# Patient Record
(demographics unavailable — no encounter records)

---

## 2025-03-18 NOTE — PHYSICAL EXAM
[No Acute Distress] : no acute distress [Well Nourished] : well nourished [Well Developed] : well developed [No Respiratory Distress] : no respiratory distress  [No Accessory Muscle Use] : no accessory muscle use [Clear to Auscultation] : lungs were clear to auscultation bilaterally [Regular Rhythm] : with a regular rhythm [Normal S1, S2] : normal S1 and S2 [Coordination Grossly Intact] : coordination grossly intact [No Focal Deficits] : no focal deficits [Normal Gait] : normal gait [Normal Affect] : the affect was normal [Alert and Oriented x3] : oriented to person, place, and time [Normal Insight/Judgement] : insight and judgment were intact

## 2025-03-18 NOTE — HISTORY OF PRESENT ILLNESS
[FreeTextEntry1] : f/up  [de-identified] : 46 yr old female h/o PMHx HTN, DM2 (diet controlled), fibroids, ISABEL, depression, obesity. Pt former  Dr. Zhao pt, she saw Dr. Mir for CPE in May 2024 as she was living in [place]. She has since moved to [place] and wants to reestablish with this office  She ran out of her Amlodipine  , hasn't taken it in 1 week. BP today- 136/94. She states was given Norvasc at last refill , felt less side effects on it . requesting brand name   Her last HBG a1c- 6.6 % on 5/30/24, was prescribed Metformin Er 500 mg po qd but never took the med. Will recheck Hbg A1c today .  She also has a h/o fibroids, has IUS Mirena in,  states  has vaginal spotting. Taking iron tabs , wants new GYn MD.      Denies chest pain, palpitations, SOB, fever, chills, abdominal pain, nausea, vomiting, diarrhea

## 2025-03-18 NOTE — ASSESSMENT
[FreeTextEntry1] : 1, HTN  restart Norvasc 5 m gpo qd  ( pt counseled on stopping medications )  Limit sodium 2 gr daily  check BP at home, call if > 150/90  try to get 150 minuets of  exercise weekly  appropriate BW ordered today   2. DM  chekc HBG a1c   Limit carbohydrate intake by minimizing white bread / rice / pasta/ soda , potato, no added sugar.  3. abnormal Vaginal spotting  pt referred to Gyn  MD, Dr. Marquez   OV 3  months

## 2025-03-18 NOTE — REVIEW OF SYSTEMS
[Earache] : no earache [Chest Pain] : no chest pain [Shortness Of Breath] : no shortness of breath [Abdominal Pain] : no abdominal pain [Dysuria] : no dysuria [Joint Pain] : no joint pain [Itching] : no itching [Headache] : no headache [Negative] : Constitutional

## 2025-05-12 NOTE — PLAN
[FreeTextEntry1] : Plans LESS LSH for menorrhagia. Plan pre op sonogram to evaluate fibroid uterus. Patient to follow up in 1 year for annual GYN exam  Mammogram and bilateral breast US due: now, overdue Colonoscopy due: now Bone density due: pm Pap ordered  Hemoccult ordered   All questions answered, patient agreeable with plan.  I Kerrie HERNANDEZ am scribing for the presence of Dr. Scar Marquez the following sections HISTORY OF PRESENT ILLNESS, PAST MEDICAL/FAMILY/SOCIAL HISTORY; REVIEW OF SYSTEMS; VITAL SIGNS; PHYSICAL EXAM; DISPOSITION. I personally performed the services described in the documentation, reviewed the documentation recorded by the scribe in my presence and it accurately and completely records my words and actions

## 2025-05-12 NOTE — HISTORY OF PRESENT ILLNESS
[FreeTextEntry1] : 46 year old female  PMHx HTN, DM2 (diet controlled), fibroids, ISABEL, depression, obesity Mirena IUD in place 10/2023 presents as a new patient here to establish care. referred by Mirian Recinos NP.  Patient is reports since IUD has been placed she's experiencing continuous vaginal bleeding. She reports fatigue.  Hemocue Today in office 9.8.  She had a GI consult but they deferred exam and advised GYN consult. Never had a Mammo.

## 2025-05-12 NOTE — HISTORY OF PRESENT ILLNESS
[FreeTextEntry1] : 46 year old female  PMHx HTN, DM2 (diet controlled), fibroids, ISABEL, depression, obesity Mirena IUD in place 10/2023 presents as a new patient here to establish care. referred by Mirain Recinos NP.  Patient is reports since IUD has been placed she's experiencing continuous vaginal bleeding. She reports fatigue.  Hemocue Today in office 9.8.  She had a GI consult but they deferred exam and advised GYN consult. Never had a Mammo.

## 2025-05-12 NOTE — PHYSICAL EXAM
[Appropriately responsive] : appropriately responsive [Alert] : alert [No Acute Distress] : no acute distress [No Lymphadenopathy] : no lymphadenopathy [Soft] : soft [Non-tender] : non-tender [Non-distended] : non-distended [No HSM] : No HSM [No Lesions] : no lesions [No Mass] : no mass [Oriented x3] : oriented x3 [Examination Of The Breasts] : a normal appearance [No Masses] : no breast masses were palpable [Labia Majora] : normal [Labia Minora] : normal [Normal] : normal [Uterine Adnexae] : normal [Normal rectal exam] : was normal [FreeTextEntry2] : Kerrie Bruno [Occult Blood Positive] : was negative for occult blood analysis [FreeTextEntry5] : IUD string not visualized [FreeTextEntry6] : 16 week size mobile uterus